# Patient Record
Sex: FEMALE | Race: WHITE | Employment: FULL TIME | URBAN - METROPOLITAN AREA
[De-identification: names, ages, dates, MRNs, and addresses within clinical notes are randomized per-mention and may not be internally consistent; named-entity substitution may affect disease eponyms.]

---

## 2017-03-14 PROBLEM — K43.6 VENTRAL HERNIA WITH BOWEL OBSTRUCTION: Status: ACTIVE | Noted: 2017-03-14

## 2017-03-15 PROBLEM — E66.9 OBESITY: Status: ACTIVE | Noted: 2017-03-15

## 2017-03-15 PROBLEM — E11.9 DM2 (DIABETES MELLITUS, TYPE 2) (HCC): Status: ACTIVE | Noted: 2017-03-15

## 2017-03-15 PROBLEM — K57.90 DIVERTICULOSIS: Status: ACTIVE | Noted: 2017-03-15

## 2017-03-16 PROBLEM — E55.9 VITAMIN D DEFICIENCY: Status: ACTIVE | Noted: 2017-03-16

## 2017-03-16 PROBLEM — R50.9 FEVER: Status: ACTIVE | Noted: 2017-03-16

## 2017-03-16 PROBLEM — E87.6 HYPOKALEMIA: Status: ACTIVE | Noted: 2017-03-16

## 2017-03-16 PROBLEM — D50.9 IRON DEFICIENCY ANEMIA: Status: ACTIVE | Noted: 2017-03-16

## 2021-03-09 ENCOUNTER — TRANSCRIBE ORDER (OUTPATIENT)
Dept: SCHEDULING | Age: 61
End: 2021-03-09

## 2021-03-09 DIAGNOSIS — R22.1 MASS IN NECK: ICD-10-CM

## 2021-03-09 DIAGNOSIS — R19.5 ABNORMAL FECES: Primary | ICD-10-CM

## 2021-03-26 ENCOUNTER — TRANSCRIBE ORDER (OUTPATIENT)
Dept: SCHEDULING | Age: 61
End: 2021-03-26

## 2021-03-26 DIAGNOSIS — R19.5 ABNORMAL STOOLS: Primary | ICD-10-CM

## 2021-04-07 ENCOUNTER — HOSPITAL ENCOUNTER (OUTPATIENT)
Dept: ULTRASOUND IMAGING | Age: 61
Discharge: HOME | End: 2021-04-07
Attending: FAMILY MEDICINE
Payer: COMMERCIAL

## 2021-04-07 ENCOUNTER — HOSPITAL ENCOUNTER (OUTPATIENT)
Dept: CT IMAGING | Age: 61
Discharge: HOME | End: 2021-04-07
Attending: FAMILY MEDICINE
Payer: COMMERCIAL

## 2021-04-07 DIAGNOSIS — R22.1 MASS IN NECK: ICD-10-CM

## 2021-04-07 DIAGNOSIS — R19.5 ABNORMAL STOOLS: ICD-10-CM

## 2021-04-07 LAB — CREAT UR-MCNC: 0.6 MG/DL (ref 0.6–1.3)

## 2021-04-07 PROCEDURE — 70491 CT SOFT TISSUE NECK W/DYE: CPT

## 2021-04-07 PROCEDURE — 76700 US EXAM ABDOM COMPLETE: CPT

## 2021-04-07 PROCEDURE — 82565 ASSAY OF CREATININE: CPT

## 2021-04-07 PROCEDURE — 74011000636 HC RX REV CODE- 636

## 2021-04-07 RX ADMIN — IOPAMIDOL 100 ML: 612 INJECTION, SOLUTION INTRAVENOUS at 12:23

## 2021-04-12 ENCOUNTER — TRANSCRIBE ORDER (OUTPATIENT)
Dept: SCHEDULING | Age: 61
End: 2021-04-12

## 2021-04-12 DIAGNOSIS — N13.30 HYDRONEPHROSIS: Primary | ICD-10-CM

## 2021-04-15 ENCOUNTER — HOSPITAL ENCOUNTER (OUTPATIENT)
Dept: CT IMAGING | Age: 61
Discharge: HOME | End: 2021-04-15
Attending: FAMILY MEDICINE
Payer: COMMERCIAL

## 2021-04-15 DIAGNOSIS — N13.30 HYDRONEPHROSIS: ICD-10-CM

## 2021-04-15 PROCEDURE — 74177 CT ABD & PELVIS W/CONTRAST: CPT

## 2021-04-15 PROCEDURE — 74011000636 HC RX REV CODE- 636

## 2021-04-15 RX ADMIN — IOPAMIDOL 100 ML: 612 INJECTION, SOLUTION INTRAVENOUS at 13:40

## 2022-03-18 PROBLEM — D50.9 IRON DEFICIENCY ANEMIA: Status: ACTIVE | Noted: 2017-03-16

## 2022-03-18 PROBLEM — E11.9 DM2 (DIABETES MELLITUS, TYPE 2) (HCC): Status: ACTIVE | Noted: 2017-03-15

## 2022-03-19 PROBLEM — E55.9 VITAMIN D DEFICIENCY: Status: ACTIVE | Noted: 2017-03-16

## 2022-03-20 PROBLEM — E87.6 HYPOKALEMIA: Status: ACTIVE | Noted: 2017-03-16

## 2022-03-20 PROBLEM — R50.9 FEVER: Status: ACTIVE | Noted: 2017-03-16

## 2022-03-20 PROBLEM — E66.9 OBESITY: Status: ACTIVE | Noted: 2017-03-15

## 2022-03-20 PROBLEM — K57.90 DIVERTICULOSIS: Status: ACTIVE | Noted: 2017-03-15

## 2022-03-20 PROBLEM — K43.6 VENTRAL HERNIA WITH BOWEL OBSTRUCTION: Status: ACTIVE | Noted: 2017-03-14

## 2023-04-26 ENCOUNTER — HOSPITAL ENCOUNTER (OUTPATIENT)
Facility: HOSPITAL | Age: 63
Discharge: HOME | End: 2023-04-29

## 2023-04-26 VITALS — HEIGHT: 68 IN | WEIGHT: 293 LBS | BODY MASS INDEX: 44.41 KG/M2

## 2023-04-26 NOTE — PROGRESS NOTES
Nutrition Note      Medical Weight Loss Multi-Disciplinary Program    Patient's Name: Carmen Shaffer      Age: 58 y.o. YOB: 1960      Sex: female    Session #1. Pt attended in-person class. Weight obtained in office. Date: 4/26/2023    Vitals:    04/26/23 1628   Weight: (!) 301 lb 11.2 oz (136.9 kg)   Height: 5' 8\" (1.727 m)      Body mass index is 45.87 kg/m². Pt attended nutrition class and has not yet been seen by a provider for initial bariatric surgery consult. Consult originally scheduled for: 4/10/23, pt did not arrive, and rescheduled for 5/5/23. Do you smoke? no    Alcohol intake:  Number of drinks per week: 0    Class Guidelines    Guidelines are reviewed with patient at the start of every class. 1. Patient understands that weight loss trial classes must be consecutive. Patient understands if they miss a class, it is their responsibility to contact me to reschedule class. I will reach out to patient after their first no show. 2.  Patient understands the expectations that weight maintenance/weight loss is expected during the classes. Failure to demonstrate changes may result in extension of weight loss trial, followed by returning to see the surgeon. Patient understands that they CANNOT gain any weight during the weight loss trial.  Gaining weight will result in extension of weight loss trial.  3. Patient is also instructed to complete their labwork, psychological evaluation visit, and any other tests that the surgeon has used while they are working on their weight loss trial.  4.  Patient was instructed to bring their packet of nutrition education materials to every class and appointment. Eating Habits and Behaviors    Today in class we reviewed the Key Diet Principles. Patient was encouraged to consume 3 meals each day, and the timing the meals was reviewed.   Meal time behaviors that will help pt to be successful with their weight loss efforts were

## 2023-05-05 ENCOUNTER — OFFICE VISIT (OUTPATIENT)
Age: 63
End: 2023-05-05

## 2023-05-05 VITALS
OXYGEN SATURATION: 98 % | BODY MASS INDEX: 44.41 KG/M2 | WEIGHT: 293 LBS | SYSTOLIC BLOOD PRESSURE: 140 MMHG | DIASTOLIC BLOOD PRESSURE: 60 MMHG | HEIGHT: 68 IN | HEART RATE: 58 BPM | TEMPERATURE: 98.2 F

## 2023-05-05 DIAGNOSIS — E78.5 HYPERLIPIDEMIA, UNSPECIFIED HYPERLIPIDEMIA TYPE: ICD-10-CM

## 2023-05-05 DIAGNOSIS — I35.0 NONRHEUMATIC AORTIC VALVE STENOSIS: ICD-10-CM

## 2023-05-05 DIAGNOSIS — K43.2 INCISIONAL HERNIA, WITHOUT OBSTRUCTION OR GANGRENE: ICD-10-CM

## 2023-05-05 DIAGNOSIS — E11.59 TYPE 2 DIABETES MELLITUS WITH OTHER CIRCULATORY COMPLICATION, WITHOUT LONG-TERM CURRENT USE OF INSULIN (HCC): ICD-10-CM

## 2023-05-05 DIAGNOSIS — E88.81 METABOLIC SYNDROME X: ICD-10-CM

## 2023-05-05 DIAGNOSIS — I10 PRIMARY HYPERTENSION: ICD-10-CM

## 2023-05-05 DIAGNOSIS — M19.90 ARTHRITIS: ICD-10-CM

## 2023-05-05 DIAGNOSIS — E11.9 TYPE 2 DIABETES MELLITUS WITHOUT COMPLICATION, WITHOUT LONG-TERM CURRENT USE OF INSULIN (HCC): ICD-10-CM

## 2023-05-05 DIAGNOSIS — E66.01 MORBID OBESITY WITH BMI OF 40.0-44.9, ADULT (HCC): ICD-10-CM

## 2023-05-05 DIAGNOSIS — E66.01 MORBID OBESITY (HCC): Primary | ICD-10-CM

## 2023-05-05 PROBLEM — R50.9 FEVER: Status: RESOLVED | Noted: 2017-03-16 | Resolved: 2023-05-05

## 2023-05-05 PROBLEM — E88.810 METABOLIC SYNDROME X: Status: ACTIVE | Noted: 2023-05-05

## 2023-05-05 PROBLEM — K43.6 VENTRAL HERNIA WITH BOWEL OBSTRUCTION: Status: RESOLVED | Noted: 2017-03-14 | Resolved: 2023-05-05

## 2023-05-05 PROBLEM — E87.6 HYPOKALEMIA: Status: RESOLVED | Noted: 2017-03-16 | Resolved: 2023-05-05

## 2023-05-05 RX ORDER — DAPAGLIFLOZIN AND METFORMIN HYDROCHLORIDE 5; 1000 MG/1; MG/1
TABLET, FILM COATED, EXTENDED RELEASE ORAL
COMMUNITY

## 2023-05-05 RX ORDER — THIAMINE MONONITRATE (VIT B1) 100 MG
100 TABLET ORAL DAILY
COMMUNITY
End: 2023-05-05

## 2023-05-05 RX ORDER — SEMAGLUTIDE 1.34 MG/ML
INJECTION, SOLUTION SUBCUTANEOUS
COMMUNITY
Start: 2023-04-17

## 2023-05-05 RX ORDER — NEBIVOLOL HYDROCHLORIDE 5 MG/1
TABLET ORAL
COMMUNITY
Start: 2023-02-11

## 2023-05-05 RX ORDER — AMLODIPINE AND VALSARTAN 10; 320 MG/1; MG/1
TABLET ORAL
COMMUNITY
Start: 2023-02-11

## 2023-05-05 RX ORDER — CANAGLIFLOZIN AND METFORMIN HYDROCHLORIDE 150; 1000 MG/1; MG/1
1 TABLET, FILM COATED ORAL 2 TIMES DAILY
COMMUNITY
Start: 2019-01-08 | End: 2023-05-05 | Stop reason: ALTCHOICE

## 2023-05-05 RX ORDER — HYDROCHLOROTHIAZIDE 25 MG/1
25 TABLET ORAL DAILY PRN
COMMUNITY
Start: 2023-02-23

## 2023-05-05 RX ORDER — NITROGLYCERIN 0.4 MG/1
TABLET SUBLINGUAL
COMMUNITY

## 2023-05-05 RX ORDER — ROSUVASTATIN CALCIUM 10 MG/1
TABLET, COATED ORAL
COMMUNITY
Start: 2023-03-23

## 2023-05-05 RX ORDER — ASPIRIN 81 MG/1
81 TABLET ORAL DAILY
COMMUNITY

## 2023-05-05 RX ORDER — CITALOPRAM 40 MG/1
TABLET ORAL
COMMUNITY
Start: 2023-02-11

## 2023-05-05 RX ORDER — MULTIVIT WITH MINERALS/LUTEIN
1000 TABLET ORAL DAILY
COMMUNITY

## 2023-05-05 RX ORDER — TRAMADOL HYDROCHLORIDE 50 MG/1
TABLET ORAL
COMMUNITY
Start: 2018-08-27

## 2023-05-05 ASSESSMENT — PATIENT HEALTH QUESTIONNAIRE - PHQ9
SUM OF ALL RESPONSES TO PHQ QUESTIONS 1-9: 0
SUM OF ALL RESPONSES TO PHQ9 QUESTIONS 1 & 2: 0
2. FEELING DOWN, DEPRESSED OR HOPELESS: 0
1. LITTLE INTEREST OR PLEASURE IN DOING THINGS: 0
SUM OF ALL RESPONSES TO PHQ QUESTIONS 1-9: 0

## 2023-05-05 NOTE — PROGRESS NOTES
patients medication treatment will ultimately be made the patients PCP with input by our office. History of Aortic stenosis- The patient has undergone or will undergo a preoperative evaluation by their cardiologist, or a cardiologist of their choice such that they are deemed a reasonable candidate for surgery. The patient understands that with a history of cardiac disease that there is always an increased risk compared   to the average patient. Appropriate recommendations have been followed as recommended by the cardiologist.  The patients ASA will be resumed   approximately 1 month postoperatively in a coated form if appropriate. The patient will see Dr Ruby Mae for cardiac clearance prior to surgery.      Signed By: Anabel Mayer MD     May 5, 2023       Total time spent was 75 minutes

## 2023-05-18 ENCOUNTER — HOSPITAL ENCOUNTER (OUTPATIENT)
Facility: HOSPITAL | Age: 63
Discharge: HOME | End: 2023-05-21

## 2023-05-18 VITALS — WEIGHT: 293 LBS | HEIGHT: 68 IN | BODY MASS INDEX: 44.41 KG/M2

## 2023-05-24 ENCOUNTER — HOSPITAL ENCOUNTER (OUTPATIENT)
Facility: HOSPITAL | Age: 63
Setting detail: OUTPATIENT SURGERY
Discharge: HOME | End: 2023-05-27
Payer: COMMERCIAL

## 2023-05-24 ENCOUNTER — HOSPITAL ENCOUNTER (OUTPATIENT)
Facility: HOSPITAL | Age: 63
Discharge: HOME | End: 2023-05-27
Payer: COMMERCIAL

## 2023-05-24 VITALS
BODY MASS INDEX: 43.95 KG/M2 | OXYGEN SATURATION: 100 % | RESPIRATION RATE: 18 BRPM | HEIGHT: 68 IN | WEIGHT: 290 LBS | TEMPERATURE: 96 F | SYSTOLIC BLOOD PRESSURE: 134 MMHG | DIASTOLIC BLOOD PRESSURE: 57 MMHG | HEART RATE: 66 BPM

## 2023-05-24 DIAGNOSIS — E66.01 MORBID OBESITY (HCC): ICD-10-CM

## 2023-05-24 PROCEDURE — 74240 X-RAY XM UPR GI TRC 1CNTRST: CPT | Performed by: SPECIALIST

## 2023-05-24 PROCEDURE — 74220 X-RAY XM ESOPHAGUS 1CNTRST: CPT

## 2023-05-24 PROCEDURE — 74240 X-RAY XM UPR GI TRC 1CNTRST: CPT

## 2023-05-24 NOTE — PROCEDURES
Patient:Janneth Escobar   : 1960  Medical Record Number:[id number]            PREPROCEDURE DIAGNOSIS: This patient is preoperative for laparoscopic sleeve gastrectomy procedure with a history of  reflux disease. POSTPROCEDURE DIAGNOSIS: This patient is preoperative for laparoscopic sleeve gastrectomy procedure with a history of  reflux disease. PROCEDURES PERFORMED: Upper GI study with barium. ESTIMATED BLOOD LOSS: None. SPECIMENS: None. STATEMENT OF MEDICAL NECESSITY: The patient is a patient with a  longstanding history of obesity. They are now considering the laparoscopic sleeve gastrectomy procedure as a means of surgical weight control and due to their history of reflux disease and are being assessed preoperatively for such. DESCRIPTION OF PROCEDURE: The patient was brought to the fluoroscopy unit and  was given thin barium. On swallowing of barium, they were noted to have  normal peristalsis of their esophagus. They had prompt filling of distal  esophagus with tapering into the gastroesophageal junction. There was no evidence of a hiatal hernia present. Contrast then filled the gastric cardia, fundus,body and pre pyloric region with no abnormalities noted. Contrast then exited the pylorus in normal fashion. No obstruction was noted. There was no evidence of reflux noted.     (abnormal anatomy in that she has a long stomach but it does empty in a timely fashion - no need for GES)    Smitha Quinteros MD

## 2023-06-15 ENCOUNTER — HOSPITAL ENCOUNTER (OUTPATIENT)
Facility: HOSPITAL | Age: 63
Discharge: HOME | End: 2023-06-18

## 2023-06-15 VITALS — WEIGHT: 284.1 LBS | HEIGHT: 68 IN | BODY MASS INDEX: 43.06 KG/M2

## 2024-06-18 ENCOUNTER — TRANSCRIBE ORDERS (OUTPATIENT)
Facility: HOSPITAL | Age: 64
End: 2024-06-18

## 2024-06-18 ENCOUNTER — HOSPITAL ENCOUNTER (OUTPATIENT)
Facility: HOSPITAL | Age: 64
Discharge: HOME | End: 2024-06-21
Payer: COMMERCIAL

## 2024-06-18 DIAGNOSIS — M25.561 RIGHT KNEE PAIN, UNSPECIFIED CHRONICITY: ICD-10-CM

## 2024-06-18 DIAGNOSIS — M23.241 DERANGEMENT OF ANTERIOR HORN OF LATERAL MENISCUS OF RIGHT KNEE DUE TO OLD INJURY: ICD-10-CM

## 2024-06-18 DIAGNOSIS — M25.561 RIGHT KNEE PAIN, UNSPECIFIED CHRONICITY: Primary | ICD-10-CM

## 2024-06-18 LAB
ALBUMIN SERPL-MCNC: 4 G/DL (ref 3.4–5)
ALBUMIN/GLOB SERPL: 1.3 (ref 0.8–1.7)
ALP SERPL-CCNC: 123 U/L (ref 45–117)
ALT SERPL-CCNC: 31 U/L (ref 13–56)
ANION GAP SERPL CALC-SCNC: 6 MMOL/L (ref 3–18)
AST SERPL-CCNC: 16 U/L (ref 10–38)
BILIRUB SERPL-MCNC: 0.6 MG/DL (ref 0.2–1)
BUN SERPL-MCNC: 14 MG/DL (ref 7–18)
BUN/CREAT SERPL: 24 (ref 12–20)
CALCIUM SERPL-MCNC: 9.2 MG/DL (ref 8.5–10.1)
CHLORIDE SERPL-SCNC: 109 MMOL/L (ref 100–111)
CO2 SERPL-SCNC: 25 MMOL/L (ref 21–32)
CREAT SERPL-MCNC: 0.58 MG/DL (ref 0.6–1.3)
ERYTHROCYTE [DISTWIDTH] IN BLOOD BY AUTOMATED COUNT: 13.7 % (ref 11.6–14.5)
EST. AVERAGE GLUCOSE BLD GHB EST-MCNC: 131 MG/DL
GLOBULIN SER CALC-MCNC: 3.2 G/DL (ref 2–4)
GLUCOSE SERPL-MCNC: 130 MG/DL (ref 74–99)
HBA1C MFR BLD: 6.2 % (ref 4.2–5.6)
HCT VFR BLD AUTO: 43.8 % (ref 35–45)
HGB BLD-MCNC: 14.4 G/DL (ref 12–16)
MCH RBC QN AUTO: 28.5 PG (ref 24–34)
MCHC RBC AUTO-ENTMCNC: 32.9 G/DL (ref 31–37)
MCV RBC AUTO: 86.7 FL (ref 78–100)
NRBC # BLD: 0 K/UL (ref 0–0.01)
NRBC BLD-RTO: 0 PER 100 WBC
PLATELET # BLD AUTO: 271 K/UL (ref 135–420)
PMV BLD AUTO: 11.1 FL (ref 9.2–11.8)
POTASSIUM SERPL-SCNC: 4.2 MMOL/L (ref 3.5–5.5)
PROT SERPL-MCNC: 7.2 G/DL (ref 6.4–8.2)
RBC # BLD AUTO: 5.05 M/UL (ref 4.2–5.3)
SODIUM SERPL-SCNC: 140 MMOL/L (ref 136–145)
WBC # BLD AUTO: 6.8 K/UL (ref 4.6–13.2)

## 2024-06-18 PROCEDURE — 36415 COLL VENOUS BLD VENIPUNCTURE: CPT

## 2024-06-18 PROCEDURE — 85027 COMPLETE CBC AUTOMATED: CPT

## 2024-06-18 PROCEDURE — 80053 COMPREHEN METABOLIC PANEL: CPT

## 2024-06-18 PROCEDURE — 83036 HEMOGLOBIN GLYCOSYLATED A1C: CPT

## 2025-03-24 ENCOUNTER — HOSPITAL ENCOUNTER (EMERGENCY)
Facility: HOSPITAL | Age: 65
Discharge: HOME | End: 2025-03-24
Payer: MEDICAID

## 2025-03-24 VITALS
SYSTOLIC BLOOD PRESSURE: 171 MMHG | HEIGHT: 68 IN | DIASTOLIC BLOOD PRESSURE: 69 MMHG | RESPIRATION RATE: 14 BRPM | BODY MASS INDEX: 44.41 KG/M2 | TEMPERATURE: 97 F | WEIGHT: 293 LBS | OXYGEN SATURATION: 99 % | HEART RATE: 86 BPM

## 2025-03-24 DIAGNOSIS — L03.319 CELLULITIS OF TRUNK, UNSPECIFIED SITE OF TRUNK: Primary | ICD-10-CM

## 2025-03-24 DIAGNOSIS — E11.65 TYPE 2 DIABETES MELLITUS WITH HYPERGLYCEMIA, WITHOUT LONG-TERM CURRENT USE OF INSULIN (HCC): ICD-10-CM

## 2025-03-24 DIAGNOSIS — Z76.0 ENCOUNTER FOR MEDICATION REFILL: ICD-10-CM

## 2025-03-24 DIAGNOSIS — R35.0 URINARY FREQUENCY: ICD-10-CM

## 2025-03-24 LAB
APPEARANCE UR: CLEAR
BACTERIA URNS QL MICRO: ABNORMAL /HPF
BILIRUB UR QL: NEGATIVE
COLOR UR: ABNORMAL
EPITH CASTS URNS QL MICRO: ABNORMAL /LPF (ref 0–5)
GLUCOSE BLD STRIP.AUTO-MCNC: 249 MG/DL (ref 70–110)
GLUCOSE UR STRIP.AUTO-MCNC: >1000 MG/DL
HGB UR QL STRIP: NEGATIVE
KETONES UR QL STRIP.AUTO: 40 MG/DL
LEUKOCYTE ESTERASE UR QL STRIP.AUTO: NEGATIVE
NITRITE UR QL STRIP.AUTO: NEGATIVE
PH UR STRIP: 5.5 (ref 5–8)
PROT UR STRIP-MCNC: 300 MG/DL
RBC #/AREA URNS HPF: ABNORMAL /HPF (ref 0–5)
SP GR UR REFRACTOMETRY: >1.03 (ref 1–1.03)
UROBILINOGEN UR QL STRIP.AUTO: 1 EU/DL (ref 0.2–1)
WBC URNS QL MICRO: ABNORMAL /HPF (ref 0–5)

## 2025-03-24 PROCEDURE — 81001 URINALYSIS AUTO W/SCOPE: CPT

## 2025-03-24 PROCEDURE — 87086 URINE CULTURE/COLONY COUNT: CPT

## 2025-03-24 PROCEDURE — 82962 GLUCOSE BLOOD TEST: CPT

## 2025-03-24 PROCEDURE — 99283 EMERGENCY DEPT VISIT LOW MDM: CPT

## 2025-03-24 ASSESSMENT — PAIN - FUNCTIONAL ASSESSMENT: PAIN_FUNCTIONAL_ASSESSMENT: NONE - DENIES PAIN

## 2025-03-24 NOTE — ED PROVIDER NOTES
EMERGENCY DEPARTMENT HISTORY AND PHYSICAL EXAM      Date: 3/24/2025  Patient Name: Janneth Goff    History of Presenting Illness     Chief Complaint   Patient presents with    Breast Problem       History (Context): Janneth Goff is a 64 y.o. female  presents to the ED today with chief complaint of left breast redness and pain for the past few days.  Had normal mammogram results 8/2024.  Denies drainage, ulcerations, nipple discharge.  For the past few days, pt also has had increased urinary frequency.      PCP: Adan Mejia MD    No current facility-administered medications for this encounter.     Current Outpatient Medications   Medication Sig Dispense Refill    metFORMIN (GLUCOPHAGE) 500 MG tablet Take 1 tablet by mouth 2 times daily (with meals) 180 tablet 1    amoxicillin-clavulanate (AUGMENTIN) 875-125 MG per tablet Take 1 tablet by mouth 2 times daily for 10 days 20 tablet 0    amLODIPine-valsartan (EXFORGE)  MG per tablet       aspirin 81 MG EC tablet Take 1 tablet by mouth daily      vitamin D (CHOLECALCIFEROL) 25 MCG (1000 UT) TABS tablet Take 1 tablet by mouth daily      citalopram (CELEXA) 40 MG tablet       hydroCHLOROthiazide (HYDRODIURIL) 25 MG tablet Take 1 tablet by mouth daily as needed      BYSTOLIC 5 MG tablet       nitroGLYCERIN (NITROSTAT) 0.4 MG SL tablet Nitrostat 0.4 mg sublingual tablet      rosuvastatin (CRESTOR) 10 MG tablet       OZEMPIC, 1 MG/DOSE, 4 MG/3ML SOPN INJECT 1 MG UNDER THE SKIN WEEKLY      traMADol (ULTRAM) 50 MG tablet tramadol 50 mg tablet      Dapagliflozin-metFORMIN HCl ER (XIGDUO XR) 5-1000 MG TB24 Take by mouth      Cyanocobalamin (VITAMIN B 12) 100 MCG LOZG Take by mouth      vitamin E 1000 units capsule Take 1 capsule by mouth daily         Past History     Past Medical History:   Past Medical History:   Diagnosis Date    Aortic stenosis     Arthritis     hips, knees and back    Diabetes (HCC) 2008    Hyperlipidemia     Hypertension 8/20/2014

## 2025-03-26 LAB
BACTERIA SPEC CULT: ABNORMAL
BACTERIA SPEC CULT: ABNORMAL
CC UR VC: ABNORMAL
SERVICE CMNT-IMP: ABNORMAL

## 2025-03-27 ENCOUNTER — RESULTS FOLLOW-UP (OUTPATIENT)
Facility: HOSPITAL | Age: 65
End: 2025-03-27